# Patient Record
Sex: FEMALE | Race: WHITE | NOT HISPANIC OR LATINO
[De-identification: names, ages, dates, MRNs, and addresses within clinical notes are randomized per-mention and may not be internally consistent; named-entity substitution may affect disease eponyms.]

---

## 2019-12-08 ENCOUNTER — TRANSCRIPTION ENCOUNTER (OUTPATIENT)
Age: 69
End: 2019-12-08

## 2021-01-04 ENCOUNTER — TRANSCRIPTION ENCOUNTER (OUTPATIENT)
Age: 71
End: 2021-01-04

## 2021-01-29 ENCOUNTER — TRANSCRIPTION ENCOUNTER (OUTPATIENT)
Age: 71
End: 2021-01-29

## 2021-10-30 ENCOUNTER — TRANSCRIPTION ENCOUNTER (OUTPATIENT)
Age: 71
End: 2021-10-30

## 2022-08-21 ENCOUNTER — APPOINTMENT (OUTPATIENT)
Dept: ORTHOPEDIC SURGERY | Facility: CLINIC | Age: 72
End: 2022-08-21

## 2022-08-21 VITALS — BODY MASS INDEX: 28.32 KG/M2 | WEIGHT: 170 LBS | HEIGHT: 65 IN

## 2022-08-21 DIAGNOSIS — I10 ESSENTIAL (PRIMARY) HYPERTENSION: ICD-10-CM

## 2022-08-21 DIAGNOSIS — Z86.39 PERSONAL HISTORY OF OTHER ENDOCRINE, NUTRITIONAL AND METABOLIC DISEASE: ICD-10-CM

## 2022-08-21 PROBLEM — Z00.00 ENCOUNTER FOR PREVENTIVE HEALTH EXAMINATION: Status: ACTIVE | Noted: 2022-08-21

## 2022-08-21 PROCEDURE — 99205 OFFICE O/P NEW HI 60 MIN: CPT

## 2022-08-21 PROCEDURE — 72100 X-RAY EXAM L-S SPINE 2/3 VWS: CPT | Mod: 26

## 2022-08-21 PROCEDURE — 72170 X-RAY EXAM OF PELVIS: CPT | Mod: 26

## 2022-08-21 RX ORDER — BUSPIRONE HCL 5 MG
TABLET ORAL
Refills: 0 | Status: ACTIVE | COMMUNITY

## 2022-08-21 RX ORDER — HYDROCHLOROTHIAZIDE 50 MG/5 ML
SOLUTION, ORAL ORAL
Refills: 0 | Status: DISCONTINUED | COMMUNITY
End: 2022-08-21

## 2022-08-21 RX ORDER — LEVOTHYROXINE SODIUM 300 UG/1
300 TABLET ORAL
Refills: 0 | Status: ACTIVE | COMMUNITY

## 2022-08-21 RX ORDER — CYCLOBENZAPRINE HYDROCHLORIDE 5 MG/1
5 TABLET, FILM COATED ORAL 3 TIMES DAILY
Qty: 20 | Refills: 0 | Status: ACTIVE | COMMUNITY
Start: 2022-08-21 | End: 1900-01-01

## 2022-08-21 NOTE — PHYSICAL EXAM
[5___] : right extensor hallicus longus 5[unfilled]/5 [No bony abnormalities] : No bony abnormalities [Straightening consistent with spasm] : Straightening consistent with spasm [] : no palpable masses [TWNoteComboBox7] : forward flexion 30 degrees [de-identified] : extension 0 degrees [de-identified] : left lateral rotation 15 degrees [TWNoteComboBox6] : right lateral rotation 15 degrees [de-identified] : False

## 2022-08-21 NOTE — HISTORY OF PRESENT ILLNESS
[10] : 10 [7] : 7 [Constant] : constant [Household chores] : household chores [Work] : work [Rest] : rest [Meds] : meds [Exercising] : exercising [] : no [FreeTextEntry5] : pt c.o pain in low back  states she had plantar fascitis and was compensating and hurt her back, was on medrol for it which helped. hx seeing dr white for similar issue was rx flexeril and medrol which helped. no b/b issues. intermittent paresthesias in left foot. [de-identified] : 06/17/2019 [de-identified] : dr white

## 2022-09-01 ENCOUNTER — APPOINTMENT (OUTPATIENT)
Dept: ORTHOPEDIC SURGERY | Facility: CLINIC | Age: 72
End: 2022-09-01

## 2022-09-01 PROCEDURE — 99203 OFFICE O/P NEW LOW 30 MIN: CPT

## 2022-09-01 NOTE — PHYSICAL EXAM
[5___] : right extensor hallicus longus 5[unfilled]/5 [No bony abnormalities] : No bony abnormalities [Straightening consistent with spasm] : Straightening consistent with spasm [] : negative sitting straight leg raise [TWNoteComboBox7] : forward flexion 30 degrees [de-identified] : extension 0 degrees [de-identified] : left lateral rotation 15 degrees [TWNoteComboBox6] : right lateral rotation 15 degrees

## 2022-09-01 NOTE — HISTORY OF PRESENT ILLNESS
[10] : 10 [7] : 7 [Constant] : constant [Household chores] : household chores [Work] : work [Rest] : rest [Meds] : meds [Exercising] : exercising [de-identified] : 9/1/22: Pt states the Medrol dose back alleviated the pain but then it returned but not as bad a before the medication. Pt has been attending Pt and has seen mild improvement with pain. \par \par 8/21/22: LETICIA MORAN, NOTES:  pt c.o pain in low back  states she had plantar fascitis and was compensating and hurt her back, was on medrol for it which helped. hx seeing dr white for similar issue was rx flexeril and medrol which helped. no b/b issues. intermittent paresthesias in left foot.\par \par 6/17/19- Has h/o lumbar issues, has been active exercising, doing yoga, had stressful year which she thinks has\par contributed. No pain, just tightness. No radicular complaints [] : no [FreeTextEntry5] : pt c.o pain in low back  states she had plantar fascitis and was compensating and hurt her back, was on medrol for it which helped. hx seeing dr white for similar issue was rx flexeril and medrol which helped. no b/b issues. intermittent paresthesias in left foot. [de-identified] : 06/17/2019 [de-identified] : dr white

## 2022-09-15 ENCOUNTER — APPOINTMENT (OUTPATIENT)
Dept: ORTHOPEDIC SURGERY | Facility: CLINIC | Age: 72
End: 2022-09-15

## 2022-09-15 VITALS — BODY MASS INDEX: 28.32 KG/M2 | HEIGHT: 65 IN | WEIGHT: 170 LBS

## 2022-09-15 PROCEDURE — 99213 OFFICE O/P EST LOW 20 MIN: CPT

## 2022-09-15 RX ORDER — MELOXICAM 7.5 MG/1
7.5 TABLET ORAL DAILY
Qty: 30 | Refills: 1 | Status: ACTIVE | COMMUNITY
Start: 2022-09-15 | End: 1900-01-01

## 2022-09-15 NOTE — ASSESSMENT
[FreeTextEntry1] : 72 year old with low back pain and left leg pain.  Her back pain has improved.  has some pain in left hip as well.   Likely from IT band.  Had a set back at PT when they tried to progress her with increased resistance.  Her pain was improved over this past week.  LIkely sort off some recurrent inflammation in left hip bursa.  FU after 2 -3 months for repeat evaluation if pain has not improved.  Continue with muscle relaxants PRN.

## 2022-09-15 NOTE — HISTORY OF PRESENT ILLNESS
[Gradual] : gradual [4] : 4 [0] : 0 [Dull/Aching] : dull/aching [Frequent] : frequent [Heat] : heat [Physical therapy] : physical therapy [de-identified] : 09/15/2022: 72 year old female here as consult from [facility] sided low back pain. Also has lateral hip pain that radiates down lateral thigh to knee. No groin pain. Denies radicular sx/ N/ T/ weakness. Denies b/b incontinence.\par \par Has tried HEP / PT/ muscle relaxers and MDP with some improvement\par No h/o spine surgery\par \par PMHx: No DM/ cancer/ no blood thinners\par \par Occupation:\par \par   [] : no [FreeTextEntry1] : BACK [FreeTextEntry3] : 08/20/22 [FreeTextEntry5] : PT STATED SHE HAS BEEN HAVING ESCALATING BACK SINCE DATE ABOVE WITH NNI [FreeTextEntry7] : LOWER BACK TO TOES [FreeTextEntry9] : STRETCHING [de-identified] : 09/12/2022

## 2022-09-15 NOTE — IMAGING
[de-identified] : Spine:\par Inspection/Palpation:\par No tenderness to palpation throughout Cervical/thoracic/lumbar spine.\par No bony stepoffs, No lesions.\par \par Gait:\par antalgic, able to perform bilateral toe and heel rise. Able to perform tandem gait. \par \par Neurologic:\par Bilateral Lower Extremities 5/5 Iliopsoas/Quadriceps/Hamstrings/ Tibialis Anterior/ Gastrocnemius. Extensor Hallucis Longus/ Flexor Hallucis Longus except \par \par Sensation intact to light touch L2-S1\par \par Patellar/ Achilles reflex within normal limits.\par  \par \par No TTP over left greater troch.\par

## 2022-10-08 ENCOUNTER — APPOINTMENT (OUTPATIENT)
Dept: ORTHOPEDIC SURGERY | Facility: CLINIC | Age: 72
End: 2022-10-08

## 2022-10-08 VITALS — BODY MASS INDEX: 27.49 KG/M2 | WEIGHT: 165 LBS | HEIGHT: 65 IN

## 2022-10-08 DIAGNOSIS — M47.812 SPONDYLOSIS W/OUT MYELOPATHY OR RADICULOPATHY, CERVICAL REGION: ICD-10-CM

## 2022-10-08 PROCEDURE — 99213 OFFICE O/P EST LOW 20 MIN: CPT

## 2022-10-08 PROCEDURE — 99203 OFFICE O/P NEW LOW 30 MIN: CPT

## 2022-10-08 PROCEDURE — 72040 X-RAY EXAM NECK SPINE 2-3 VW: CPT

## 2022-10-08 NOTE — ASSESSMENT
[FreeTextEntry1] : She is advised to continue PT and is given a new prescription for both her neck and lower back.  f/u with Dr Galloway as scheduled.

## 2022-10-08 NOTE — PHYSICAL EXAM
[Biceps 2+] : biceps 2+ [Triceps 2+] : triceps 2+ [Brachioradialis 2+] : brachioradialis 2+ [Straightening consistent with spasm] : Straightening consistent with spasm [Disc space narrowing] : Disc space narrowing [NL (45)] : extension 45 degrees [NL (40)] : right lateral bending 40 degrees [5___] : right extensor hallicus longus 5[unfilled]/5 [] : non-antalgic [TWNoteComboBox7] : forward flexion 60 degrees [de-identified] : extension 30 degrees [de-identified] : left lateral bending 30 degrees [de-identified] : left lateral rotation 30 degrees [de-identified] : right lateral bending 25 degrees [TWNoteComboBox6] : right lateral rotation 25 degrees

## 2022-10-08 NOTE — HISTORY OF PRESENT ILLNESS
[Neck] : neck [Lower back] : lower back [Gradual] : gradual [Localized] : localized [Tightness] : tightness [Intermittent] : intermittent [Household chores] : household chores [Physical therapy] : physical therapy [de-identified] : 10/08/2022 pt presents here today for a follow up on the lower spine , pt is currently doing physical therapy which it has help.also having neck stiffness about 3 days.  No radicular symptoms.\par no injury\par pt is seeing Dr Galloway for the lower spine  [] : no [FreeTextEntry5] : no injury  [FreeTextEntry6] : stiffness [de-identified] : while doing pt [de-identified] : Dr Galloway [de-identified] : x [facility] of the lower spine [facility]  [de-identified] : physical therapy

## 2022-11-03 ENCOUNTER — APPOINTMENT (OUTPATIENT)
Dept: ORTHOPEDIC SURGERY | Facility: CLINIC | Age: 72
End: 2022-11-03

## 2022-11-17 ENCOUNTER — APPOINTMENT (OUTPATIENT)
Dept: ORTHOPEDIC SURGERY | Facility: CLINIC | Age: 72
End: 2022-11-17

## 2023-04-07 ENCOUNTER — APPOINTMENT (OUTPATIENT)
Dept: ORTHOPEDIC SURGERY | Facility: CLINIC | Age: 73
End: 2023-04-07
Payer: COMMERCIAL

## 2023-04-07 DIAGNOSIS — S33.5XXA SPRAIN OF LIGAMENTS OF LUMBAR SPINE, INITIAL ENCOUNTER: ICD-10-CM

## 2023-04-07 PROCEDURE — 99213 OFFICE O/P EST LOW 20 MIN: CPT

## 2023-04-07 RX ORDER — METHYLPREDNISOLONE 4 MG/1
4 TABLET ORAL
Qty: 1 | Refills: 1 | Status: ACTIVE | COMMUNITY
Start: 2022-08-21 | End: 1900-01-01

## 2023-04-07 RX ORDER — CYCLOBENZAPRINE HYDROCHLORIDE 5 MG/1
5 TABLET, FILM COATED ORAL
Qty: 60 | Refills: 0 | Status: ACTIVE | COMMUNITY
Start: 2022-09-01 | End: 1900-01-01

## 2023-04-07 NOTE — HISTORY OF PRESENT ILLNESS
[10] : 10 [7] : 7 [Constant] : constant [Household chores] : household chores [Work] : work [Rest] : rest [Meds] : meds [Exercising] : exercising [de-identified] : 4/7/23: Here for fu low back. Going away to [place]. Continues with HEP with some help. would like a MDP and flexeril which has helped in the past.\par \par 9/1/22: Pt states the Medrol dose back alleviated the pain but then it returned but not as bad a before the medication. Pt has been attending Pt and has seen mild improvement with pain. \par \par 8/21/22: LETICIA MORAN, NOTES:  pt c.o pain in low back  states she had plantar fascitis and was compensating and hurt her back, was on medrol for it which helped. hx seeing dr white for similar issue was rx flexeril and medrol which helped. no b/b issues. intermittent paresthesias in left foot.\par \par 6/17/19- Has h/o lumbar issues, has been active exercising, doing yoga, had stressful year which she thinks has\par contributed. No pain, just tightness. No radicular complaints [] : no [FreeTextEntry5] : pt c.o pain in low back  states she had plantar fascitis and was compensating and hurt her back, was on medrol for it which helped. hx seeing dr white for similar issue was rx flexeril and medrol which helped. no b/b issues. intermittent paresthesias in left foot. [de-identified] : 06/17/2019 [de-identified] : dr white

## 2023-04-07 NOTE — PHYSICAL EXAM
[5___] : right extensor hallicus longus 5[unfilled]/5 [No bony abnormalities] : No bony abnormalities [Straightening consistent with spasm] : Straightening consistent with spasm [] : negative sitting straight leg raise

## 2023-04-09 ENCOUNTER — FORM ENCOUNTER (OUTPATIENT)
Age: 73
End: 2023-04-09

## 2023-04-10 ENCOUNTER — APPOINTMENT (OUTPATIENT)
Dept: MRI IMAGING | Facility: CLINIC | Age: 73
End: 2023-04-10
Payer: COMMERCIAL

## 2023-04-10 PROCEDURE — 72148 MRI LUMBAR SPINE W/O DYE: CPT

## 2023-04-14 ENCOUNTER — APPOINTMENT (OUTPATIENT)
Dept: ORTHOPEDIC SURGERY | Facility: CLINIC | Age: 73
End: 2023-04-14
Payer: COMMERCIAL

## 2023-04-14 DIAGNOSIS — M54.16 RADICULOPATHY, LUMBAR REGION: ICD-10-CM

## 2023-04-14 DIAGNOSIS — M51.36 OTHER INTERVERTEBRAL DISC DEGENERATION, LUMBAR REGION: ICD-10-CM

## 2023-04-14 PROCEDURE — 99442: CPT

## 2024-10-29 ENCOUNTER — APPOINTMENT (OUTPATIENT)
Dept: ORTHOPEDIC SURGERY | Facility: CLINIC | Age: 74
End: 2024-10-29
Payer: COMMERCIAL

## 2024-10-29 PROCEDURE — 73502 X-RAY EXAM HIP UNI 2-3 VIEWS: CPT

## 2024-10-29 PROCEDURE — 72100 X-RAY EXAM L-S SPINE 2/3 VWS: CPT

## 2024-10-29 PROCEDURE — 99213 OFFICE O/P EST LOW 20 MIN: CPT

## 2024-10-29 RX ORDER — METHYLPREDNISOLONE 4 MG/1
4 TABLET ORAL
Qty: 1 | Refills: 0 | Status: ACTIVE | COMMUNITY
Start: 2024-10-29 | End: 1900-01-01

## 2024-11-03 PROBLEM — M51.369 DISC DEGENERATION, LUMBAR: Status: ACTIVE | Noted: 2023-04-14

## 2024-11-18 ENCOUNTER — APPOINTMENT (OUTPATIENT)
Dept: ORTHOPEDIC SURGERY | Facility: CLINIC | Age: 74
End: 2024-11-18
Payer: COMMERCIAL

## 2024-11-18 DIAGNOSIS — M54.16 RADICULOPATHY, LUMBAR REGION: ICD-10-CM

## 2024-11-18 DIAGNOSIS — M51.369: ICD-10-CM

## 2024-11-18 DIAGNOSIS — S33.5XXA SPRAIN OF LIGAMENTS OF LUMBAR SPINE, INITIAL ENCOUNTER: ICD-10-CM

## 2024-11-18 PROCEDURE — 99213 OFFICE O/P EST LOW 20 MIN: CPT

## 2024-12-09 ENCOUNTER — APPOINTMENT (OUTPATIENT)
Dept: PAIN MANAGEMENT | Facility: CLINIC | Age: 74
End: 2024-12-09
Payer: COMMERCIAL

## 2024-12-09 VITALS — WEIGHT: 169 LBS | HEIGHT: 65 IN | BODY MASS INDEX: 28.16 KG/M2

## 2024-12-09 DIAGNOSIS — M47.812 SPONDYLOSIS W/OUT MYELOPATHY OR RADICULOPATHY, CERVICAL REGION: ICD-10-CM

## 2024-12-09 DIAGNOSIS — M54.16 RADICULOPATHY, LUMBAR REGION: ICD-10-CM

## 2024-12-09 PROCEDURE — 99244 OFF/OP CNSLTJ NEW/EST MOD 40: CPT

## 2025-01-15 ENCOUNTER — RX RENEWAL (OUTPATIENT)
Age: 75
End: 2025-01-15

## 2025-04-21 ENCOUNTER — APPOINTMENT (OUTPATIENT)
Dept: PAIN MANAGEMENT | Facility: CLINIC | Age: 75
End: 2025-04-21

## 2025-05-21 ENCOUNTER — NON-APPOINTMENT (OUTPATIENT)
Age: 75
End: 2025-05-21